# Patient Record
Sex: FEMALE | Race: WHITE | NOT HISPANIC OR LATINO | Employment: FULL TIME | URBAN - METROPOLITAN AREA
[De-identification: names, ages, dates, MRNs, and addresses within clinical notes are randomized per-mention and may not be internally consistent; named-entity substitution may affect disease eponyms.]

---

## 2024-08-16 ENCOUNTER — PATIENT ROUNDING (BHMG ONLY) (OUTPATIENT)
Dept: OBSTETRICS AND GYNECOLOGY | Facility: CLINIC | Age: 44
End: 2024-08-16
Payer: COMMERCIAL

## 2024-08-16 ENCOUNTER — OFFICE VISIT (OUTPATIENT)
Dept: OBSTETRICS AND GYNECOLOGY | Facility: CLINIC | Age: 44
End: 2024-08-16
Payer: COMMERCIAL

## 2024-08-16 VITALS
DIASTOLIC BLOOD PRESSURE: 92 MMHG | WEIGHT: 270 LBS | HEIGHT: 66 IN | SYSTOLIC BLOOD PRESSURE: 140 MMHG | BODY MASS INDEX: 43.39 KG/M2 | RESPIRATION RATE: 18 BRPM

## 2024-08-16 DIAGNOSIS — N93.9 ABNORMAL UTERINE BLEEDING (AUB): Primary | ICD-10-CM

## 2024-08-16 DIAGNOSIS — Z01.419 WOMEN'S ANNUAL ROUTINE GYNECOLOGICAL EXAMINATION: ICD-10-CM

## 2024-08-16 DIAGNOSIS — D25.1 INTRAMURAL LEIOMYOMA OF UTERUS: ICD-10-CM

## 2024-08-16 DIAGNOSIS — Z12.39 ENCOUNTER FOR BREAST CANCER SCREENING USING NON-MAMMOGRAM MODALITY: ICD-10-CM

## 2024-08-16 DIAGNOSIS — Z12.11 COLON CANCER SCREENING: ICD-10-CM

## 2024-08-16 RX ORDER — CETIRIZINE HYDROCHLORIDE 10 MG/1
TABLET ORAL
COMMUNITY

## 2024-08-16 RX ORDER — LISINOPRIL AND HYDROCHLOROTHIAZIDE 12.5; 1 MG/1; MG/1
TABLET ORAL
COMMUNITY

## 2024-08-16 RX ORDER — BUSPIRONE HYDROCHLORIDE 5 MG/1
TABLET ORAL
COMMUNITY

## 2024-08-16 RX ORDER — METOPROLOL SUCCINATE 50 MG/1
1 TABLET, EXTENDED RELEASE ORAL DAILY
COMMUNITY
Start: 2024-07-22

## 2024-08-16 NOTE — PROGRESS NOTES
"     Gynecologic Annual Exam Note          GYN Annual Exam     Gynecologic Exam        Subjective     HPI  Faviola Mckenzie is a 44 y.o. female, No obstetric history on file., who presents for annual well woman exam as a previous patient not seen in the last three years. There were no changes to her medical or surgical history since her last visit..  Patient's last menstrual period was 07/26/2024 (exact date).  Patient reports she usually has a period that last three weeks and has a week break in between.  The flow is light. Patient stated her periods are light but the bleeding is \"noticeable\" . She denies dysmenorrhea. Marital Status: single. She is not currently sexually active. STD testing recommendations have been explained to the patient and she declines STD testing.    The patient would like to discuss the following complaints today: Irregular periods. Patient states she has light periods that last up to 3 weeks before having a week break and her periods starting again.    Additional OB/GYN History   contraceptive methods: None  Desires to: Patient is undecided if she wants birth control or not  History of migraines: no    Last Pap : 2007. Result: unknown . HPV: HPV pool +.   Last Completed Pap Smear       This patient has no relevant Health Maintenance data.          History of abnormal Pap smear: yes - 2007 HPV+  Family history of uterine, colon, breast, or ovarian cancer: no  Performs monthly Self-Breast Exam: yes  Last mammogram: N/A.     Last Completed Mammogram       This patient has no relevant Health Maintenance data.            Colonoscopy: has never had a colonoscopy or cologuard  Exercises Regularly: yes  Feelings of Anxiety or Depression: yes - patient takes Buspar    Tobacco Usage?: No       Current Outpatient Medications:     busPIRone (BUSPAR) 5 MG tablet, , Disp: , Rfl:     cetirizine (ZyrTEC Allergy) 10 MG tablet, , Disp: , Rfl:     lisinopril-hydrochlorothiazide (PRINZIDE,ZESTORETIC) 10-12.5 MG " "per tablet, , Disp: , Rfl:     metoprolol succinate XL (TOPROL-XL) 50 MG 24 hr tablet, Take 1 tablet by mouth Daily., Disp: , Rfl:      Patient denies the need for medication refills today.    OB History    No obstetric history on file.         Past Medical History:   Diagnosis Date    Abnormal Pap smear of cervix 2007    year is approximate    Anxiety 2009    HPV (human papilloma virus) infection 2007    year is approximate    Hyperlipidemia 2020    Hypertension 2009    controlled with medication    Varicella 1983        Past Surgical History:   Procedure Laterality Date    PELVIC LAPAROSCOPY  2007    year is approximate    WISDOM TOOTH EXTRACTION  2003       Health Maintenance   Topic Date Due    Annual Gynecologic Pelvic and Breast Exam  Never done    MAMMOGRAM  Never done    LIPID PANEL  Never done    BMI FOLLOWUP  Never done    COLORECTAL CANCER SCREENING  Never done    HEPATITIS C SCREENING  Never done    ANNUAL PHYSICAL  Never done    PAP SMEAR  Never done    INFLUENZA VACCINE  08/01/2024    TDAP/TD VACCINES (2 - Td or Tdap) 03/13/2028    COVID-19 Vaccine  Completed    Pneumococcal Vaccine 0-64  Aged Out       The additional following portions of the patient's history were reviewed and updated as appropriate: allergies, current medications, past family history, past medical history, past social history, past surgical history, and problem list.    Review of Systems      I have reviewed and agree with the HPI, ROS, and historical information as entered above. Mita Yanes MD          Objective   /92   Resp 18   Ht 167.6 cm (66\")   Wt 122 kg (270 lb)   LMP 07/26/2024 (Exact Date)   BMI 43.58 kg/m²     Physical Exam  Vitals and nursing note reviewed. Exam conducted with a chaperone present.   Constitutional:       Appearance: She is well-developed.   HENT:      Head: Normocephalic and atraumatic.   Neck:      Thyroid: No thyroid mass or thyromegaly.   Cardiovascular:      Rate and Rhythm: Normal " rate and regular rhythm.      Heart sounds: No murmur heard.  Pulmonary:      Effort: Pulmonary effort is normal. No retractions.      Breath sounds: Normal breath sounds. No wheezing, rhonchi or rales.   Chest:      Chest wall: No mass or tenderness.   Breasts:     Right: Normal. No mass, nipple discharge, skin change or tenderness.      Left: Normal. No mass, nipple discharge, skin change or tenderness.   Abdominal:      General: Bowel sounds are normal.      Palpations: Abdomen is soft. Abdomen is not rigid. There is no mass.      Tenderness: There is no abdominal tenderness. There is no guarding.      Hernia: No hernia is present. There is no hernia in the left inguinal area.   Genitourinary:     Labia:         Right: No rash, tenderness or lesion.         Left: No rash, tenderness or lesion.       Vagina: Normal. No vaginal discharge or lesions.      Cervix: No cervical motion tenderness, discharge, lesion or cervical bleeding.      Uterus: Normal. Not enlarged, not fixed and not tender.       Adnexa:         Right: No mass or tenderness.          Left: No mass or tenderness.        Rectum: No external hemorrhoid.   Musculoskeletal:      Cervical back: Normal range of motion. No muscular tenderness.   Neurological:      Mental Status: She is alert and oriented to person, place, and time.   Psychiatric:         Behavior: Behavior normal.      Patient's last menstrual period was 07/26/2024 (exact date)..  The patient has a history of AUB and presents for an endometrial biopsy.  After the indications, risks, benefits, and alternatives to performing and endometrial biopsy were explained to the patient. A urine pregnancy test was not indicated.    PROCEDURE:  The patient was placed on the table in the supine lithotomy position.  She was draped in the appropriate manner.  A speculum was placed in the vagina.  The cervix was visualized and prepped with Betadine. A tenaculum was not used. A small plastic 5 mm Pipelle  syringe curette was inserted into the cervical canal.  The uterus was sounded to 7 cms.  A vigorous four quadrant biopsy was performed, removing a moderate amount of tissue.  This tissue was placed in Formalin and sent to pathology.  The patient tolerated the procedure well and reported Mild cramping.  She had No cramping at the time of discharge.      Assessment and Plan    Problem List Items Addressed This Visit    None  Visit Diagnoses       Abnormal uterine bleeding (AUB)    -  Primary    Relevant Orders    US Non-ob Transvaginal    T4, Free    LIQUID-BASED PAP SMEAR WITH HPV GENOTYPING REGARDLESS OF INTERPRETATION ([facility])    TISSUE EXAM, [facility] ([facility])    Women's annual routine gynecological examination        Relevant Orders    T4, Free    LIQUID-BASED PAP SMEAR WITH HPV GENOTYPING REGARDLESS OF INTERPRETATION ([facility])    TISSUE EXAM, [facility] ([facility])    Encounter for breast cancer screening using non-mammogram modality        Relevant Orders    Mammo Screening Digital Tomosynthesis Bilateral With CAD    Intramural leiomyoma of uterus        Relevant Orders    US Non-ob Transvaginal    Colon cancer screening        Relevant Orders    Cologuard - Stool, Per Rectum            GYN annual well woman exam.   Pap guidelines reviewed.  Recommended use of Vitamin D replacement and getting adequate calcium in her diet. (1500mg)  Reviewed monthly self breast exams.  Instructed to call with lumps, pain, or breast discharge.    Strongly encouraged to start yearly mammography  Reviewed HPV guidelines.  Reviewed exercise as a preventative health measures.   IMB - u/s showed two small leiomyoma.  Endometrial biopsy performed today and discussed options for management of the bleeding.  She will consider.  She has labs with her PCP every 6 months.  Leiomyoma - discussed etiology and natural history. Understand low 1/1000 risk of leiomyosarcoma. Discussed need for intervention when symptomatic but  otherwise can be watched conservatively   Return in about 6 months (around 2/16/2025) for US with Next Visit.     Mita Yanes MD  08/16/2024

## 2024-08-16 NOTE — PROGRESS NOTES
August 16, 2024    Hello, may I speak with Faviola Mckenzie?    My name is Ramonita       I am  with [facility]  [facility]  [address] [place] [address] [phone].    Before we get started may I verify your date of birth? 1980    I am calling to officially welcome you to our practice and ask about your recent visit. Is this a good time to talk? yes    Tell me about your visit with us. What things went well?  Everything went well.        We're always looking for ways to make our patients' experiences even better. Do you have recommendations on ways we may improve?  no    Overall were you satisfied with your first visit to our practice? yes       I appreciate you taking the time to speak with me today. Is there anything else I can do for you? no      Thank you, and have a great day.

## 2024-08-22 LAB
REF LAB TEST METHOD: NORMAL
REF LAB TEST METHOD: NORMAL

## 2024-10-21 ENCOUNTER — TELEPHONE (OUTPATIENT)
Dept: OBSTETRICS AND GYNECOLOGY | Facility: CLINIC | Age: 44
End: 2024-10-21
Payer: COMMERCIAL